# Patient Record
Sex: MALE | Race: WHITE | HISPANIC OR LATINO | Employment: OTHER | URBAN - METROPOLITAN AREA
[De-identification: names, ages, dates, MRNs, and addresses within clinical notes are randomized per-mention and may not be internally consistent; named-entity substitution may affect disease eponyms.]

---

## 2017-11-23 ENCOUNTER — APPOINTMENT (EMERGENCY)
Dept: RADIOLOGY | Facility: HOSPITAL | Age: 62
End: 2017-11-23

## 2017-11-23 ENCOUNTER — HOSPITAL ENCOUNTER (EMERGENCY)
Facility: HOSPITAL | Age: 62
Discharge: HOME | End: 2017-11-23
Attending: EMERGENCY MEDICINE | Admitting: EMERGENCY MEDICINE

## 2017-11-23 VITALS
DIASTOLIC BLOOD PRESSURE: 84 MMHG | HEART RATE: 86 BPM | WEIGHT: 140 LBS | TEMPERATURE: 97.7 F | OXYGEN SATURATION: 98 % | SYSTOLIC BLOOD PRESSURE: 135 MMHG | RESPIRATION RATE: 16 BRPM

## 2017-11-23 DIAGNOSIS — M54.9 BACK PAIN: Primary | ICD-10-CM

## 2017-11-23 PROCEDURE — 72070 X-RAY EXAM THORAC SPINE 2VWS: CPT

## 2017-11-23 PROCEDURE — 96372 THER/PROPH/DIAG INJ SC/IM: CPT

## 2017-11-23 PROCEDURE — 99283 EMERGENCY DEPT VISIT LOW MDM: CPT

## 2017-11-23 PROCEDURE — 72100 X-RAY EXAM L-S SPINE 2/3 VWS: CPT

## 2017-11-23 RX ORDER — KETOROLAC TROMETHAMINE 30 MG/ML
15 INJECTION, SOLUTION INTRAMUSCULAR; INTRAVENOUS ONCE
Status: COMPLETED | OUTPATIENT
Start: 2017-11-23 | End: 2017-11-23

## 2017-11-23 RX ORDER — ACETAMINOPHEN 325 MG/1
975 TABLET ORAL ONCE
Status: COMPLETED | OUTPATIENT
Start: 2017-11-23 | End: 2017-11-23

## 2017-11-23 RX ORDER — LIDOCAINE 50 MG/G
1 PATCH TOPICAL ONCE
Status: DISCONTINUED | OUTPATIENT
Start: 2017-11-23 | End: 2017-11-23 | Stop reason: HOSPADM

## 2017-11-23 RX ORDER — IBUPROFEN 600 MG/1
600 TABLET ORAL EVERY 6 HOURS PRN
Qty: 30 TABLET | Refills: 0 | Status: SHIPPED | OUTPATIENT
Start: 2017-11-23

## 2017-11-23 RX ADMIN — LIDOCAINE 1 PATCH: 50 PATCH TOPICAL at 18:29

## 2017-11-23 RX ADMIN — ACETAMINOPHEN 975 MG: 325 TABLET, FILM COATED ORAL at 18:26

## 2017-11-23 RX ADMIN — KETOROLAC TROMETHAMINE 15 MG: 30 INJECTION, SOLUTION INTRAMUSCULAR at 18:27

## 2017-11-24 NOTE — ED ATTENDING ATTESTATION
Jonny Rodríguez MD, saw and evaluated the patient  I have discussed the patient with the resident/non-physician practitioner and agree with the resident's/non-physician practitioner's findings, Plan of Care, and MDM as documented in the resident's/non-physician practitioner's note, except where noted  All available labs and Radiology studies were reviewed  At this point I agree with the current assessment done in the Emergency Department  I have conducted an independent evaluation of this patient a history and physical is as follows:  Back pain in entire back, burning and aching, worse with twisting  Woke him up when he rolled over during the night  No fever, no numbness/tingling/weakness, no bowel/bladder sxs, no ivda  No trauma  Ros otherwise neg and 12 systems reviewed  Plan medications, xray to r/o pathological fx   xrays reviewed, t spine with evidence of osteoarthritis   Agree with plan    Critical Care Time  CritCare Time

## 2017-11-24 NOTE — DISCHARGE INSTRUCTIONS
Sigue a tu doctor in 1-3 fox  Si los sintomas regresen o se pongan peor regresa a departmento de emergencia de inmediatol    Dolor yuridia de espalda inferior   LO QUE NECESITA SABER:   El dolor yuridia de la región lumbar de la espalda es lisa molestia repentina en la parte inferior de andino espalda que dura hasta por 6 semanas  La molestia hace que sea dificil que usted tolere la [place]  INSTRUCCIONES SOBRE EL GABRIELA HOSPITALARIA:   Regrese a la alexia de emergencias si:   · Usted tiene dolor intenso  · Usted repentinamente tiene rigidez o siente pesadez en ambos glúteos hacia abajo de ambas piernas  · Usted tiene entumecimiento o debilidad en lisa pierna o dolor en ambas piernas  · Usted tiene entumecimiento en el área genital o en la región lumbar  · Usted no puede controlar andino orina ni mg deposiciones intestinales  Pregúntele a andino [facility] vitaminas y minerales son adecuados para usted  · Usted tiene fiebre  · Usted tiene un dolor por la noche o cuando descansa  · Andino dolor no mejora con el tratamiento  · Usted tiene dolor que empeora cuando tose o estornuda  · Usted siente un estallido o chasquido repentino en andino espalda  · Usted tiene preguntas o inquietudes acerca de andino condición o cuidado  Medicamentos:  Los siguientes medicamentos pueden  ser recetados por andino médico:  · El acetaminofén  marcella el dolor  Está disponible sin receta médica  Pregunte la cantidad y la frecuencia con que debe tomarlos  [address]  El acetaminofén puede causar daño en el hígado cuando no se jesu de forma correcta  · AINEs (Analgésicos antiinflamatorios no esteroides)  ayudan a disminuir la inflamación y el dolor  Sharlene medicamento esta disponible con o sin lisa receta médica  Los AINEs pueden causar sangrado estomacal o problemas renales en ciertas personas  Si usted jesu un medicamento anticoagulante, siempre pregúntele a andino médico si los JOSE A son seguros para usted   Siempre jairo la etiqueta de iyzicoex [employer] y Crowe Lily instrucciones  · Un medicamento con receta para el dolor  podrían ser Laurey Pu  Pregunte al médico cómo debe mandie frannie medicamento de forma dhaliwal  · Relajantes musculares  disminuyen el dolor y [employer] músculos de la parte inferior de la columna  · [place] mg medicamentos remedios se le haya indicado  Consulte con nadino médico si usted janiya que andino medicamento no le está ayudando o si presenta efectos secundarios  Infórmele si es alérgico a algún medicamento  Mantenga lisa lista actualizada de los [place], las vitaminas y los productos herbales que jesu  Incluya los siguientes datos de los medicamentos: cantidad, frecuencia y motivo de administración  Traiga con usted la lista o los envases de la píldoras a mg citas de seguimiento  Lleve la lista de los medicamentos con usted en celia de lisa emergencia  Cuidados personales:   · Manténgase activo  lo más que pueda sin causar más dolor  El reposo en cama puede empeorar andino dolor de espalda  Comience con ejercicios ligeros ermedios caminar  Evite levantar objetos hasta que ya no tenga dolor  Solicite más información acerca de las actividades físicas o plan de ejercicios que son los adecuados para usted  · El hielo  ayuda a disminuir la inflamación, el dolor y los espasmos musculares  Ponga hielo bryan en lisa bolsa plástica  Cúbrala con lisa toalla  Aplíquela en andino cherrie lumbar por 20 a 30 minutos cada 2 horas  Shreya esto por 2 a 3 días después que el dolor empiece, o según lo indicado  · El calor  ayuda a disminuir dolor y espasmos musculares  Empiece a utilizar calor después de rudolph terminado el tratamiento con el hielo  Utilice lisa toalla pequeña empapada con Circle, lisa almohada térmica o tome un baño de ana con agua tibia  Aplíquese calor en el área lesionada alek 20 a 30 minutos cada 2 horas alke la cantidad de [employer] indiquen  [place] entre el calor y el hielo    Prevenir el dolor yuridia de la parte inferior de la espalda:   · Use la mecánica corporal adecuada  ¨ Flexione la cadera y las rodillas cuando Sarah Mcclain a levantar un objeto  No doble la cintura  Utilice los [employer] de las piernas mientras levanta andino carga  No use andino espalda  Mantenga el objeto cerca de andino pecho mientras lo levanta  No se tuerza, ni levante cualquier cosa por encima de andino cintura  ¨ Cambie andino posición frecuentemente cuando pase mucho tiempo de pie  Descanse un pie sobre lisa Phylliss Double o un reposapiés e intercambie con el otro pie frecuentemente  ¨ No permanezca sentado por lapsos de tiempo prolongados  Cuando sea necesario hacerlo, siéntese en lisa silla de respaldo recto con los pies apoyados en el suelo  Nunca alcance, jale ni empuje mientras se encuentra sentando  · Shreya ejercicios que fortalezcan mg músculos de la espalda  Entre en calor antes de hacer ejercicio  Consulte con andino médico sobre [employer] plan de ejercicios para usted  · Mantenga un peso saludable  Consulte con andino médico cuánto debería pesar  Pida que le ayude a crear un plan para bajar de peso si usted tiene sobrepeso  Acuda a mg consultas de control con andino médico según le indicaron  Regrese a lisa karen de seguimiento si usted aun tiene [employer] de 1 a 3 semanas de [place]  Puede que usted necesite acudir con un ortopedista si andino dolor de espalda dura más de 12 semanas  Anote mg preguntas para que se acuerde de hacerlas alek mg visitas  © 2017 [facility] Information is for End User's use only and may not be sold, redistributed or otherwise used for commercial purposes  All illustrations and images included in CareNotes® are the copyrighted property of A D A M , Inc  or Rafat Stephie  Esta información es sólo para uso en educación  Andino intención no es darle un consejo médico sobre enfermedades o tratamientos   Colsulte con andino [place] farmacéutico antes de seguir cualquier régimen [employer] para saber si es seguro y efectivo para usted

## 2017-11-24 NOTE — ED PROVIDER NOTES
History  Chief Complaint   Patient presents with    Back Pain     pt states back hurts x3 weeks upper and lower back     47-year-old male comes emergent department for evaluation of back pain  Patient states that he has had back back pain for the past 3 weeks  Patient states the back pain is diffuse, burning from his thoracic to lumbar for spine    States that the pain has woken him up at night  Patient states that he has been taken Advil with moderate relief  Patient states that back pain is worse with position on and with movement  Patient denies any trauma or inciting events  Patient denies any urinary or bowel incontinence  Denies any numbness or weakness  Otherwise, patient denies any fever, chest pain, shortness of breath, nausea, vomiting IVDA use  Medical management decision making:  Patient presenting with is likely to be musculoskeletal back pain  I will give patient Toradol Tylenol Lidoderm patch for further evaluation  Also order a thoracic and lumbar film to evaluate for masses  None       History reviewed  No pertinent past medical history  History reviewed  No pertinent surgical history  History reviewed  No pertinent family history  I have reviewed and agree with the history as documented  Social History   Substance Use Topics    Smoking status: Never Smoker    Smokeless tobacco: Never Used    Alcohol use Yes      Comment: occasional        Review of Systems   Constitutional: Negative for appetite change, chills, diaphoresis, fatigue and fever  HENT: Negative for congestion, ear discharge, ear pain, hearing loss, postnasal drip, rhinorrhea, sneezing and sore throat  Eyes: Negative for pain, discharge and redness  Respiratory: Negative for cough, choking, chest tightness, shortness of breath, wheezing and stridor  Cardiovascular: Negative for chest pain and palpitations     Gastrointestinal: Negative for abdominal distention, abdominal pain, blood in stool, constipation, diarrhea, nausea and vomiting  Genitourinary: Negative for decreased urine volume, difficulty urinating, dysuria, flank pain, frequency and hematuria  Musculoskeletal: Positive for back pain  Negative for arthralgias, gait problem, joint swelling and neck pain  Skin: Negative for color change, pallor and rash  Allergic/Immunologic: Negative for environmental allergies, food allergies and immunocompromised state  Neurological: Negative for dizziness, seizures, weakness, light-headedness, numbness and headaches  Hematological: Negative for adenopathy  Does not bruise/bleed easily  Psychiatric/Behavioral: Negative for agitation and behavioral problems  Physical Exam  ED Triage Vitals [11/23/17 1746]   Temperature Pulse Respirations Blood Pressure SpO2   97 7 °F (36 5 °C) 86 16 135/84 98 %      Temp src Heart Rate Source Patient Position - Orthostatic VS BP Location FiO2 (%)   -- -- -- -- --      Pain Score       8           Orthostatic Vital Signs  Vitals:    11/23/17 1746   BP: 135/84   Pulse: 86       Physical Exam   Constitutional: He is oriented to person, place, and time  He appears well-developed and well-nourished  HENT:   Head: Normocephalic and atraumatic  Nose: Nose normal    Mouth/Throat: Oropharynx is clear and moist    Eyes: Conjunctivae and EOM are normal  Pupils are equal, round, and reactive to light  Neck: Normal range of motion  Neck supple  Cardiovascular: Normal rate, regular rhythm and normal heart sounds  Exam reveals no gallop and no friction rub  No murmur heard  Pulmonary/Chest: Effort normal and breath sounds normal  No respiratory distress  He has no wheezes  He has no rales  Abdominal: Soft  Bowel sounds are normal  He exhibits no distension  There is no tenderness  There is no rebound and no guarding  Musculoskeletal: Normal range of motion  He exhibits tenderness  Neurological: He is alert and oriented to person, place, and time  Skin: Skin is warm and dry  Psychiatric: He has a normal mood and affect  His behavior is normal    Nursing note and vitals reviewed  ED Medications  Medications   ketorolac (TORADOL) 30 mg/mL injection 15 mg (15 mg Intramuscular Given 11/23/17 1827)   acetaminophen (TYLENOL) tablet 975 mg (975 mg Oral Given 11/23/17 1826)       Diagnostic Studies  Results Reviewed     None                 XR lumbar spine 2 or 3 views   ED Interpretation by Marycruz Alvarez MD (11/23 1926)   No fracture or masses noted      Final Result by Luz Elena Barajas MD (11/23 1956)      No acute fracture or traumatic listhesis  Workstation performed: [id number]         XR thoracic spine 2 views   ED Interpretation by Marycruz Alvarez MD (11/23 1935)   No fracture noted or masses      Final Result by Luz Elena Barajas MD (11/23 1955)      No acute fracture or traumatic listhesis  Workstation performed: [id number]               Procedures  Procedures      Phone Consults  ED Phone Contact    ED Course  ED Course                                MDM  CritCare Time    Disposition  Final diagnoses:   Back pain     Time reflects when diagnosis was documented in both MDM as applicable and the Disposition within this note     Time User Action Codes Description Comment    11/23/2017  7:38 PM Madhu Lau Add [M54 9] Back pain       ED Disposition     ED Disposition Condition Comment    Discharge  [address] discharge to home/self care      Condition at discharge: Stable        Follow-up Information     Follow up With Specialties Details Why [facility]  In 3 days  [address] [facility] [address] [address]        Discharge Medication List as of 11/23/2017  7:42 PM      START taking these medications    Details   ibuprofen (MOTRIN) 600 mg tablet Take 1 tablet by mouth every 6 (six) hours as needed for mild pain, Starting Thu 11/23/2017, Print No discharge procedures on file  ED Provider  Attending physically available and evaluated Shabana Hayes I managed the patient along with the ED Attending      Electronically Signed by         Rita Meng MD  Resident  11/24/17 1991

## 2018-12-26 ENCOUNTER — HOSPITAL ENCOUNTER (EMERGENCY)
Facility: HOSPITAL | Age: 63
Discharge: HOME | End: 2018-12-26
Attending: EMERGENCY MEDICINE | Admitting: EMERGENCY MEDICINE

## 2018-12-26 VITALS
BODY MASS INDEX: 23.32 KG/M2 | HEIGHT: 65 IN | TEMPERATURE: 98.7 F | RESPIRATION RATE: 18 BRPM | OXYGEN SATURATION: 97 % | WEIGHT: 140 LBS | SYSTOLIC BLOOD PRESSURE: 138 MMHG | DIASTOLIC BLOOD PRESSURE: 77 MMHG | HEART RATE: 90 BPM

## 2018-12-26 DIAGNOSIS — J32.9 SINUSITIS: Primary | ICD-10-CM

## 2018-12-26 PROCEDURE — 99282 EMERGENCY DEPT VISIT SF MDM: CPT

## 2018-12-26 RX ORDER — AZITHROMYCIN 250 MG/1
TABLET, FILM COATED ORAL
Qty: 6 TABLET | Refills: 0 | Status: SHIPPED | OUTPATIENT
Start: 2018-12-26 | End: 2018-12-31

## 2018-12-26 RX ORDER — BENZONATATE 200 MG/1
200 CAPSULE ORAL 3 TIMES DAILY PRN
Qty: 21 CAPSULE | Refills: 0 | Status: SHIPPED | OUTPATIENT
Start: 2018-12-26 | End: 2019-01-02

## 2018-12-26 NOTE — DISCHARGE INSTRUCTIONS
Sinusitis   LO QUE NECESITA SABER:   La sinusitis es la inflamación o infección de los senos paranasales  La mayoría de las veces es a causa de un virus  La sinusitis aguda podría durar hasta 12 semanas  La sinusitis crónica dura más de 12 semanas  La sinusitis recurrente significa que la padece 4 o más veces en 1 año  INSTRUCCIONES SOBRE EL GABRIELA HOSPITALARIA:   Regrese a la alexia de emergencias si:   · Andino luis y párpado están enrojecidos, inflamados y adoloridos  · Usted no puede abrir andino luis  · Usted tiene cambios en la visión, remedios visión doble  · Andino globo ocular sobresale o usted no puede  andino luis  · Usted tiene más sueño de lo normal o nota cambios en andino habilidad de pensar, moverse o hablar  · Usted tiene el anthony rígido, fiebre o un hilario dolor de ruperto  · Usted tiene inflamada la frente o el cuero cabelludo  Pregúntele a andino Leamon Handsome vitaminas y minerales son adecuados para usted  · Sophia síntomas no mejoran después de 3 días  · Sophia síntomas no desaparecen después de 10 días  · Usted tiene náuseas y vómitos  · Le sangra la [place]  · Usted tiene preguntas o inquietudes acerca de andino condición o cuidado  Medicamentos:  Sophia síntomas podrían desaparecer por sí solos  Andino médico puede recomendar lisa espera atenta hasta 10 días antes de iniciar el tratamiento con antibióticos  Es posible que usted necesite alguno de los siguientes:  · Acetaminofeno:  marcella el dolor y baja la fiebre  Está disponible sin receta médica  Pregunte la cantidad y la frecuencia con que debe tomarlos  [address]  Reshma las etiquetas de todos los demás medicamentos que esté usando para saber si también contienen acetaminofén, o pregunte a andino médico o farmacéutico  El acetaminofén puede causar daño en el hígado cuando no se jesu de forma correcta  No use más de 4 gramos (4000 miligramos) en total de acetaminofeno en un día       · AINEs (Analgésicos antiinflamatorios no esteroides) remedios el ibuprofeno, ayudan a disminuir la inflamación, el dolor y la [place]  Sharlene medicamento esta disponible con o sin lisa receta médica  Los AINEs pueden causar sangrado estomacal o problemas renales en ciertas personas  Si usted jesu un medicamento anticoagulante, siempre pregúntele a andino médico si los JOSE A son seguros para usted  Siempre jairo la etiqueta de sharlene medicamento y [place] instrucciones  · Los aerosoles nasales con esteroides  podrían ayudar a disminuir la inflamación de andino nariz y senos paranasales  · Los descongestionantes  ayudan a reducir la inflamación y a drenar la mucosidad en la nariz y los senos paranasales  Los descongestionantes podrían ayudarle a respirar más fácilmente  · Los antihistamínicos  ayudan a secar la mucosidad en andino [place] y a aliviar los estornudos  · Antibióticos  ayudan a tratar o prevenir infecciones bacteriales  · [place] mg medicamentos remedios se le haya indicado  Consulte con andino médico si usted janiya que andino medicamento no le está ayudando o si presenta efectos secundarios  Infórmele si es alérgico a cualquier medicamento  Mantenga lisa lista actualizada de los [place], las vitaminas y los productos herbales que jesu  Incluya los siguientes datos de los medicamentos: cantidad, frecuencia y motivo de administración  Traiga con usted la lista o los envases de la píldoras a mg citas de seguimiento  Lleve la lista de los medicamentos con usted en celia de lisa emergencia  Cuidados personales:   · Enjuague mg senos paranasales  Use un aparato para enjuagar mg fosas nasales con lisa solución salina (agua salada) o agua destilada  No use agua de la llave  [place] ayudará a diluir la mucosidad en la nariz eliminando el polen y la suciedad  También ayudará a reducir la inflamación para que usted pueda respirar normalmente  Pregunte a andino médico con qué frecuencia hacerlo  · Respire vapor  [place] agua en un sartén hasta que salga vapor   Inclínese sobre el cuenco y jorden lisa carpa con lisa toalla rony  Respire profundamente por aproximadamente 20 minutos  Tenga cuidado de no acercarse demasiado al vapor o de quemarse  Shreya esto 3 veces al día  Usted también puede respirar profundamente mientras jesu lisa ducha caliente  · Duerma con la Joseluis Harm  Coloque lisa almohada adicional debajo de gary ruperto antes de dormir para ayudar a drenar mg senos paranasales  · [address] se le haya indicado  Pregunte a gary médico sobre la cantidad de líquido que necesita mandie todos los días y cuáles le recomienda  Los líquidos van a diluir la mucosidad en gary [place] and [place] y [facility] a drenarla  Evite las bebidas que contienen alcohol o cafeína  · No fume y evite el humo de [place]  La nicotina y otros químicos en los cigarrillos y cigarros pueden empeorar mg síntomas  Pida información a gary médico si usted actualmente fuma y necesita ayuda para dejar de fumar  Los cigarrillos electrónicos o tabaco sin humo todavía contienen nicotina  Consulte con gary médico antes de [employer]  Evite el contagio de gérmenes que causan la sinusitis:  [employer] las daphne frecuentemente con agua y [place]  [employer] las daphne después de usar el baño, cambiarle el pañal a un katie o estornudar  Lávese las daphne antes de comer o preparar alimentos  Acuda a mg consultas de control con gary médico según le indicaron  Usted puede ser referido a un especialista en garganta, oído y nariz  Anote mg preguntas para que se acuerde de hacerlas alek mg visitas  © 2017 [address] Information is for End User's use only and may not be sold, redistributed or otherwise used for commercial purposes  All illustrations and images included in CareNotes® are the copyrighted property of A D A M , Inc  or Rafat Card  Esta información es sólo para uso en educación  Gary intención no es darle un consejo médico sobre enfermedades o tratamientos   Colsulte con gary Dunbar Ringer o farmacéutico antes de seguir cualquier régimen médico para saber si es seguro y efectivo para usted

## 2018-12-26 NOTE — ED PROVIDER NOTES
History  Chief Complaint   Patient presents with    Nasal Congestion     patient reports nasal congestion and sore throat x3 weeks  been taking Mucinex with no relief    Sore Throat     27-year-old male presents emergency room for evaluation of nasal congestion and productive cough  Onset 2 weeks ago  Complains of headache and sinus pressure  States that nasal drainage is clear  Admits to sneezing  Complains of sore throat worse with swallowing and coughing  Also admits to some mild left ear pain  Denies fever  He is taking Mucinex without relief  History provided by:  Patient  Sore Throat   Associated symptoms: cough and headaches    Associated symptoms: no chest pain, no chills, no fever, no rash and no shortness of breath        Prior to Admission Medications   Prescriptions Last Dose Informant Patient Reported? Taking?   ibuprofen (MOTRIN) 600 mg tablet   No No   Sig: Take 1 tablet by mouth every 6 (six) hours as needed for mild pain      Facility-Administered Medications: None       Past Medical History:   Diagnosis Date    Renal disorder     pylonephritis       History reviewed  No pertinent surgical history  History reviewed  No pertinent family history  I have reviewed and agree with the history as documented  Social History   Substance Use Topics    Smoking status: Never Smoker    Smokeless tobacco: Never Used    Alcohol use Yes      Comment: occasional        Review of Systems   Constitutional: Negative for chills and fever  HENT: Positive for congestion, sinus pain, sinus pressure and sore throat  Respiratory: Positive for cough  Negative for shortness of breath  Cardiovascular: Negative for chest pain  Skin: Negative for rash  Neurological: Positive for headaches  Physical Exam  Physical Exam   Constitutional: He appears well-developed and well-nourished     HENT:   Right Ear: Tympanic membrane and external ear normal    Left Ear: Tympanic membrane and external ear normal    Nose: Mucosal edema and rhinorrhea present  Right sinus exhibits maxillary sinus tenderness  Left sinus exhibits maxillary sinus tenderness  Mouth/Throat: Uvula is midline, oropharynx is clear and moist and mucous membranes are normal  No tonsillar exudate  Eyes: Conjunctivae are normal    Neck: Neck supple  Cardiovascular: Normal rate, regular rhythm and normal heart sounds  Pulmonary/Chest: Effort normal and breath sounds normal    Lymphadenopathy:     He has no cervical adenopathy  Neurological: He is alert  Skin: Skin is warm and dry  No rash noted  Nursing note and vitals reviewed  Vital Signs  ED Triage Vitals [12/26/18 1456]   Temperature Pulse Respirations Blood Pressure SpO2   98 7 °F (37 1 °C) 90 18 138/77 97 %      Temp Source Heart Rate Source Patient Position - Orthostatic VS BP Location FiO2 (%)   Oral Monitor Lying Right arm --      Pain Score       No Pain           Vitals:    12/26/18 1456   BP: 138/77   Pulse: 90   Patient Position - Orthostatic VS: Lying       Visual Acuity      ED Medications  Medications - No data to display    Diagnostic Studies  Results Reviewed     None                 No orders to display              Procedures  Procedures       Phone Contacts  ED Phone Contact    ED Course                               MDM  Number of Diagnoses or Management Options  Sinusitis:   Patient Progress  Patient progress: stable    CritCare Time    Disposition  Final diagnoses:   Sinusitis     Time reflects when diagnosis was documented in both MDM as applicable and the Disposition within this note     Time User Action Codes Description Comment    12/26/2018  4:27 PM Ivania Talbot Add [J32 9] Sinusitis       ED Disposition     ED Disposition Condition Comment    Discharge  [address] discharge to home/self care      Condition at discharge: Good        Follow-up Information     Follow up With Specialties Details Why [address]  In 3 days Set up primary physician [phone]            Patient's Medications   Discharge Prescriptions    AZITHROMYCIN (ZITHROMAX) 250 MG TABLET    2 PO Daily on Day 1 then 1 PO daily days 2-5       Start Date: 12/26/2018End Date: 12/31/2018       Order Dose: --       Quantity: 6 tablet    Refills: 0    BENZONATATE (TESSALON) 200 MG CAPSULE    Take 1 capsule (200 mg total) by mouth 3 (three) times a day as needed for cough for up to 7 days       Start Date: 12/26/2018End Date: 1/2/2019       Order Dose: 200 mg       Quantity: 21 capsule    Refills: 0     No discharge procedures on file      ED Provider  Electronically Signed by           Miracle Morrow PA-C  01/15/19 5355

## 2024-05-27 ENCOUNTER — HOSPITAL ENCOUNTER (EMERGENCY)
Facility: HOSPITAL | Age: 69
Discharge: HOME | End: 2024-05-27
Attending: EMERGENCY MEDICINE | Admitting: EMERGENCY MEDICINE
Payer: MEDICARE

## 2024-05-27 VITALS
WEIGHT: 145 LBS | TEMPERATURE: 98.7 F | RESPIRATION RATE: 18 BRPM | BODY MASS INDEX: 24.16 KG/M2 | HEART RATE: 81 BPM | HEIGHT: 65 IN | SYSTOLIC BLOOD PRESSURE: 137 MMHG | DIASTOLIC BLOOD PRESSURE: 95 MMHG | OXYGEN SATURATION: 99 %

## 2024-05-27 DIAGNOSIS — L29.9 ITCHING: ICD-10-CM

## 2024-05-27 DIAGNOSIS — D75.1 ERYTHROCYTOSIS: Primary | ICD-10-CM

## 2024-05-27 LAB
ALBUMIN SERPL BCP-MCNC: 4.3 G/DL (ref 3.5–5)
ALP SERPL-CCNC: 75 U/L (ref 34–104)
ALT SERPL W P-5'-P-CCNC: 13 U/L (ref 7–52)
ANION GAP SERPL CALCULATED.3IONS-SCNC: 9 MMOL/L (ref 4–13)
AST SERPL W P-5'-P-CCNC: 19 U/L (ref 13–39)
BASOPHILS # BLD AUTO: 0.1 THOUSANDS/ÂΜL (ref 0–0.1)
BASOPHILS NFR BLD AUTO: 1 % (ref 0–1)
BILIRUB SERPL-MCNC: 1.53 MG/DL (ref 0.2–1)
BUN SERPL-MCNC: 17 MG/DL (ref 5–25)
CALCIUM SERPL-MCNC: 9.8 MG/DL (ref 8.4–10.2)
CHLORIDE SERPL-SCNC: 106 MMOL/L (ref 96–108)
CO2 SERPL-SCNC: 24 MMOL/L (ref 21–32)
CREAT SERPL-MCNC: 0.95 MG/DL (ref 0.6–1.3)
EOSINOPHIL # BLD AUTO: 0.5 THOUSAND/ÂΜL (ref 0–0.61)
EOSINOPHIL NFR BLD AUTO: 5 % (ref 0–6)
ERYTHROCYTE [DISTWIDTH] IN BLOOD BY AUTOMATED COUNT: 13.4 % (ref 11.6–15.1)
GFR SERPL CREATININE-BSD FRML MDRD: 83 ML/MIN/1.73SQ M
GLUCOSE SERPL-MCNC: 98 MG/DL (ref 65–140)
HCT VFR BLD AUTO: 53 % (ref 36.5–49.3)
HGB BLD-MCNC: 18.2 G/DL (ref 12–17)
IMM GRANULOCYTES # BLD AUTO: 0.05 THOUSAND/UL (ref 0–0.2)
IMM GRANULOCYTES NFR BLD AUTO: 1 % (ref 0–2)
LYMPHOCYTES # BLD AUTO: 3.47 THOUSANDS/ÂΜL (ref 0.6–4.47)
LYMPHOCYTES NFR BLD AUTO: 32 % (ref 14–44)
MCH RBC QN AUTO: 33.5 PG (ref 26.8–34.3)
MCHC RBC AUTO-ENTMCNC: 34.3 G/DL (ref 31.4–37.4)
MCV RBC AUTO: 98 FL (ref 82–98)
MONOCYTES # BLD AUTO: 1.14 THOUSAND/ÂΜL (ref 0.17–1.22)
MONOCYTES NFR BLD AUTO: 11 % (ref 4–12)
NEUTROPHILS # BLD AUTO: 5.63 THOUSANDS/ÂΜL (ref 1.85–7.62)
NEUTS SEG NFR BLD AUTO: 50 % (ref 43–75)
NRBC BLD AUTO-RTO: 0 /100 WBCS
PLATELET # BLD AUTO: 250 THOUSANDS/UL (ref 149–390)
PMV BLD AUTO: 10.3 FL (ref 8.9–12.7)
POTASSIUM SERPL-SCNC: 4.1 MMOL/L (ref 3.5–5.3)
PROT SERPL-MCNC: 8 G/DL (ref 6.4–8.4)
RBC # BLD AUTO: 5.43 MILLION/UL (ref 3.88–5.62)
SODIUM SERPL-SCNC: 139 MMOL/L (ref 135–147)
WBC # BLD AUTO: 10.89 THOUSAND/UL (ref 4.31–10.16)

## 2024-05-27 PROCEDURE — 36415 COLL VENOUS BLD VENIPUNCTURE: CPT

## 2024-05-27 PROCEDURE — 99283 EMERGENCY DEPT VISIT LOW MDM: CPT

## 2024-05-27 PROCEDURE — 85025 COMPLETE CBC W/AUTO DIFF WBC: CPT

## 2024-05-27 PROCEDURE — 99284 EMERGENCY DEPT VISIT MOD MDM: CPT | Performed by: EMERGENCY MEDICINE

## 2024-05-27 PROCEDURE — 80053 COMPREHEN METABOLIC PANEL: CPT

## 2024-05-27 RX ORDER — DIPHENHYDRAMINE HCL 25 MG
50 TABLET ORAL ONCE
Status: COMPLETED | OUTPATIENT
Start: 2024-05-27 | End: 2024-05-27

## 2024-05-27 RX ADMIN — DIPHENHYDRAMINE HCL 50 MG: 25 TABLET ORAL at 13:05

## 2024-05-27 NOTE — ED ATTENDING ATTESTATION
5/27/2024  I, Aj Daniels MD, saw and evaluated the patient. I have discussed the patient with the resident/non-physician practitioner and agree with the resident's/non-physician practitioner's findings, Plan of Care, and MDM as documented in the resident's/non-physician practitioner's note, except where noted. All available labs and Radiology studies were reviewed.  I was present for key portions of any procedure(s) performed by the resident/non-physician practitioner and I was immediately available to provide assistance.       At this point I agree with the current assessment done in the Emergency Department.  I have conducted an independent evaluation of this patient a history and physical is as follows:  Itchy for about 1 to 2 months   pt concerned about bites noted on leg  Non smoker   No fever no abd pain no v/d no sob  Exam nad anicteric  mmm  no lesions in mouth   Lung clear heart rrr no m abd soft  no hsm  Skin has some excoriated bite like lesions on upper thighs b/l    Imp pruritus   Likely insect bites  Check labs bilirubin renal function   As pruritus is more generalized than   ED Course   Patient has elevated hemoglobin  Concerning for polycythemia  Referral to hematology as outpatient patient agrees to follow-up  Will do referral placed    Critical Care Time  Procedures

## 2024-05-27 NOTE — ED PROVIDER NOTES
History  Chief Complaint   Patient presents with    Rash     Per pt he has had a rash x2 months, pt has taken benadryl, and topical creams and nothing is helping.      68-year-old man with relevant past medical history of CABG, hypertension, and hyperlipidemia presents with itching.  Patient reports worsening itching over the last 2 months.  It is generalized.  He has not noticed any bug bites.  The itching is worse after showering.  He has never had this happen before.  Patient recently moved here from [place] and does not have outpatient follow-up established.  He has not had fevers or chills.  Denies any other symptoms aside from the rash.  He has tried Cetaphil and eczema creams with minimal improvement.        Prior to Admission Medications   Prescriptions Last Dose Informant Patient Reported? Taking?   ibuprofen (MOTRIN) 600 mg tablet   No No   Sig: Take 1 tablet by mouth every 6 (six) hours as needed for mild pain      Facility-Administered Medications: None       Past Medical History:   Diagnosis Date    Renal disorder     pylonephritis       History reviewed. No pertinent surgical history.    History reviewed. No pertinent family history.  I have reviewed and agree with the history as documented.    E-Cigarette/Vaping     E-Cigarette/Vaping Substances     Social History     Tobacco Use    Smoking status: Never    Smokeless tobacco: Never   Substance Use Topics    Alcohol use: Yes     Comment: occasional    Drug use: No        Review of Systems    Physical Exam  ED Triage Vitals [05/27/24 1223]   Temperature Pulse Respirations Blood Pressure SpO2   98.7 °F (37.1 °C) 81 18 137/95 99 %      Temp Source Heart Rate Source Patient Position - Orthostatic VS BP Location FiO2 (%)   Temporal Monitor Sitting Left arm --      Pain Score       No Pain             Orthostatic Vital Signs  Vitals:    05/27/24 1223   BP: 137/95   Pulse: 81   Patient Position - Orthostatic VS: Sitting       Physical Exam  Vitals and  nursing note reviewed.   Constitutional:       General: He is not in acute distress.     Appearance: Normal appearance.   HENT:      Head: Normocephalic and atraumatic.      Right Ear: External ear normal.      Left Ear: External ear normal.   Cardiovascular:      Rate and Rhythm: Normal rate.   Pulmonary:      Effort: Pulmonary effort is normal. No respiratory distress.   Musculoskeletal:         General: Normal range of motion.      Cervical back: Normal range of motion and neck supple.   Skin:     General: Skin is warm and dry.      Comments: No rash appreciated.  Areas of excoriation with open wounds of the thigh.   Neurological:      Mental Status: He is alert and oriented to person, place, and time. Mental status is at baseline.   Psychiatric:         Mood and Affect: Mood normal.         Behavior: Behavior normal.                   ED Medications  Medications   diphenhydrAMINE (BENADRYL) tablet 50 mg (50 mg Oral Given 5/27/24 1305)       Diagnostic Studies  Results Reviewed       Procedure Component Value Units Date/Time    JAK2 V617F rfx CALR/MPL/E12-15 [[id number]]     Lab Status: No result Specimen: Blood     Comprehensive metabolic panel [[id number]]  (Abnormal) Collected: 05/27/24 1301    Lab Status: Final result Specimen: Blood from Arm, Left Updated: 05/27/24 1335     Sodium 139 mmol/L      Potassium 4.1 mmol/L      Chloride 106 mmol/L      CO2 24 mmol/L      ANION GAP 9 mmol/L      BUN 17 mg/dL      Creatinine 0.95 mg/dL      Glucose 98 mg/dL      Calcium 9.8 mg/dL      AST 19 U/L      ALT 13 U/L      Alkaline Phosphatase 75 U/L      Total Protein 8.0 g/dL      Albumin 4.3 g/dL      Total Bilirubin 1.53 mg/dL      eGFR 83 ml/min/1.73sq m     Narrative:      National Kidney Disease Foundation guidelines for Chronic Kidney Disease (CKD):     Stage 1 with normal or high GFR (GFR > 90 mL/min/1.73 square meters)    Stage 2 Mild CKD (GFR = 60-89 mL/min/1.73 square meters)    Stage 3A Moderate CKD (GFR = 45-59  mL/min/1.73 square meters)    Stage 3B Moderate CKD (GFR = 30-44 mL/min/1.73 square meters)    Stage 4 Severe CKD (GFR = 15-29 mL/min/1.73 square meters)    Stage 5 End Stage CKD (GFR <15 mL/min/1.73 square meters)  Note: GFR calculation is accurate only with a steady state creatinine    CBC and differential [[id number]]  (Abnormal) Collected: 05/27/24 1301    Lab Status: Final result Specimen: Blood from Arm, Left Updated: 05/27/24 1310     WBC 10.89 Thousand/uL      RBC 5.43 Million/uL      Hemoglobin 18.2 g/dL      Hematocrit 53.0 %      MCV 98 fL      MCH 33.5 pg      MCHC 34.3 g/dL      RDW 13.4 %      MPV 10.3 fL      Platelets 250 Thousands/uL      nRBC 0 /100 WBCs      Segmented % 50 %      Immature Grans % 1 %      Lymphocytes % 32 %      Monocytes % 11 %      Eosinophils Relative 5 %      Basophils Relative 1 %      Absolute Neutrophils 5.63 Thousands/µL      Absolute Immature Grans 0.05 Thousand/uL      Absolute Lymphocytes 3.47 Thousands/µL      Absolute Monocytes 1.14 Thousand/µL      Eosinophils Absolute 0.50 Thousand/µL      Basophils Absolute 0.10 Thousands/µL                    No orders to display         Procedures  Procedures      ED Course  ED Course as of 05/27/24 1445   Mon May 27, 2024   1341 Hemoglobin(!): 18.2  Concerns for polycythemia vera                             SBIRT 22yo+      Flowsheet Row Most Recent Value   Initial Alcohol Screen: US AUDIT-C     1. How often do you have a drink containing alcohol? 0 Filed at: 05/27/2024 1226   Audit-C Score 0 Filed at: 05/27/2024 1226   HANNAH: How many times in the past year have you...    Used an illegal drug or used a prescription medication for non-medical reasons? Never Filed at: 05/27/2024 1226                  Medical Decision Making  Presents with pruritus for the last 2 months.  No obvious rash appreciated but areas of excoriation were noted.  Labs obtained to rule out medical causes of pruritus.  Patient noted to have hemoglobin of 18.2.   "I am concerned for polycythemia vera as the cause of his symptoms.  I did message on-call hematology, and they recommended adding on a Jak2/calr/mpl lab and follow-up outpatient for further workup. This lab was ordered while here, but the patient left before the blood was able to be drawn. He was provided primary care and hematology referrals, so this can be worked up outside the hospital.  He can try diphenhydramine or loratadine for his itching. Patient in agreement with the medical plan and all questions were answered.  The patient verbalized understanding of my return precautions.    Previous ED, hospitalization, and outpatient documentation was reviewed.  History was obtained from the patient.    Portions or all of this note were generated using voice recognition software.  Occasional wrong word or \"sound a like\" substitutions may have occurred due to the inherent limitations of voice recognition software.  Please interpret any errors within the intended context of the whole sentence or idea.      Amount and/or Complexity of Data Reviewed  Labs: ordered. Decision-making details documented in ED Course.    Risk  OTC drugs.          Disposition  Final diagnoses:   Erythrocytosis   Itching     Time reflects when diagnosis was documented in both MDM as applicable and the Disposition within this note       Time User Action Codes Description Comment    5/27/2024  1:17 PM Santiago Schwarz Add [D75.1] Erythrocytosis     5/27/2024  1:17 PM Santiago Schwarz Add [L29.9] Itching     5/27/2024  1:47 PM Santiago Schwarz Modify [D75.1] Erythrocytosis     5/27/2024  1:47 PM Santiago Schwarz Modify [L29.9] Itching           ED Disposition       ED Disposition   Discharge    Condition   Stable    Date/Time   Mon May 27, 2024 1405    Comment   Devon Briscoe discharge to home/self care.                   Follow-up Information       Follow up With Specialties Details Why Contact Info Additional Information    [facility] " [facility] Schedule an appointment as soon as possible for a visit in 1 week  [address] [facility] [address]  [facility] [id number] [phone] [facility] [place], [address] [facility] [address] [facility] [address], [place], [place], [id number] [phone]    [facility] Internal Medicine Schedule an appointment as soon as possible for a visit in 1 week  [address] [facility] [address]  [facility] [address] [phone] [facility], [address] [facility] [address], [place], [place], [address] [phone]            Patient's Medications   Discharge Prescriptions    No medications on file         PDMP Review       None             ED Provider  Attending physically available and evaluated Devon Briscoe. I managed the patient along with the ED Attending.    Electronically Signed by           Santiago Schwarz MD  05/27/24 3934

## 2024-05-27 NOTE — DISCHARGE INSTRUCTIONS
You were seen for itching. Your hemoglobin level is elevated. You should follow up with a hematologist regarding this. It could be the cause of your itching. You can take 25 or 50 mg benadryl every 6 hours for itching. You can also try 10 mg loratidine daily for itching.    You were also referred to a primary care doctor to establish care regarding your other chronic medical conditions.

## 2024-05-31 ENCOUNTER — OFFICE VISIT (OUTPATIENT)
Dept: INTERNAL MEDICINE CLINIC | Facility: CLINIC | Age: 69
End: 2024-05-31

## 2024-05-31 VITALS
RESPIRATION RATE: 18 BRPM | WEIGHT: 145.38 LBS | HEART RATE: 76 BPM | BODY MASS INDEX: 24.22 KG/M2 | TEMPERATURE: 98.9 F | OXYGEN SATURATION: 96 % | SYSTOLIC BLOOD PRESSURE: 118 MMHG | HEIGHT: 65 IN | DIASTOLIC BLOOD PRESSURE: 77 MMHG

## 2024-05-31 DIAGNOSIS — D75.1 ERYTHROCYTOSIS: ICD-10-CM

## 2024-05-31 DIAGNOSIS — L30.9 DERMATITIS: Primary | ICD-10-CM

## 2024-05-31 DIAGNOSIS — I10 PRIMARY HYPERTENSION: ICD-10-CM

## 2024-05-31 DIAGNOSIS — E78.49 OTHER HYPERLIPIDEMIA: ICD-10-CM

## 2024-05-31 DIAGNOSIS — I25.810 CORONARY ARTERY DISEASE INVOLVING CORONARY BYPASS GRAFT OF NATIVE HEART WITHOUT ANGINA PECTORIS: ICD-10-CM

## 2024-05-31 PROCEDURE — 99204 OFFICE O/P NEW MOD 45 MIN: CPT | Performed by: FAMILY MEDICINE

## 2024-05-31 RX ORDER — CARVEDILOL 3.12 MG/1
TABLET ORAL
Qty: 90 TABLET | Refills: 3 | Status: SHIPPED | OUTPATIENT
Start: 2024-05-31

## 2024-05-31 RX ORDER — ATORVASTATIN CALCIUM 40 MG/1
40 TABLET, FILM COATED ORAL DAILY
Qty: 90 TABLET | Refills: 3 | Status: SHIPPED | OUTPATIENT
Start: 2024-05-31

## 2024-05-31 RX ORDER — CLOPIDOGREL BISULFATE 75 MG/1
75 TABLET ORAL DAILY
Qty: 90 TABLET | Refills: 3 | Status: SHIPPED | OUTPATIENT
Start: 2024-05-31

## 2024-05-31 RX ORDER — LISINOPRIL 2.5 MG/1
2.5 TABLET ORAL DAILY
Qty: 90 TABLET | Refills: 3 | Status: SHIPPED | OUTPATIENT
Start: 2024-05-31

## 2024-05-31 NOTE — PROGRESS NOTES
Ambulatory Visit  Name: Devon Briscoe      : 1955      MRN: [id number]  Encounter Provider: Xochitl Casiano MD  Encounter Date: 2024   Encounter department: [facility]    Assessment & Plan   1. Dermatitis  Assessment & Plan:  Pt has been having sx x 3 months , see detailed review , rash not very apparent on trunk , thighs not examined today continue local care Dove soap only , switch to free and clear detergent and unscented dryer sheets , continue cerave lotion await derm eval   2. Erythrocytosis  Assessment & Plan:  New finding in ED visit for evaluation of rash , looking back he has not had this , prior hgb of  16.7 , he has appt scheduled with hematology in July , will repeat CBC next week   Orders:  -     Ambulatory Referral to Internal Medicine  -     CBC and differential; Future  3. Coronary artery disease involving coronary bypass graft of native heart without angina pectoris  Assessment & Plan:  Pt had CABG in  , he has felt great ever since , has excellent stamina , no cp or SOB maintains his weight and eats healthy diet , continue same meds plavix , coreg , statin   Orders:  -     carvedilol (Coreg) 3.125 mg tablet; 1 po daily  -     clopidogrel (PLAVIX) 75 mg tablet; Take 1 tablet (75 mg total) by mouth daily  4. Primary hypertension  Assessment & Plan:  Please be watchful of diet , avoiding prepared foods and salt , exercise regularly 30- 60 minutes 4 times per week , take medications as prescribed , BP well controlled labs reviewed f/u appt 6 months    Orders:  -     lisinopril (ZESTRIL) 2.5 mg tablet; Take 1 tablet (2.5 mg total) by mouth daily  -     CBC and differential; Future  5. Other hyperlipidemia  -     atorvastatin (LIPITOR) 40 mg tablet; Take 1 tablet (40 mg total) by mouth daily  -     Lipid panel; Future         History of Present Illness     HPI New pt here to establish care  , recent ED visit  ahsly x 2 months  recently moved here from [place] , in ED hgb 18.2 , he was referred to hematology  Hx CABG 2022 , HTN , Hyperlipidemia   He walks daily , he has great stamina - cp - SOB with activity   He has a rash x 3 months mostly trunk , proximal extremities + itchy [place]   He is living in the same place , he has a dog and cat in the house , no one else I house has this   Soap for bathing was using [place] switched to Dove x a few weeks , medicated soap for rash ? Name , detergent . 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   HENT:  Negative for ear pain and sore throat.    Eyes:  Negative for pain and visual disturbance.   Respiratory:  Negative for cough and shortness of breath.    Cardiovascular:  Negative for chest pain and palpitations.   Gastrointestinal:  Negative for abdominal pain and vomiting.   Genitourinary:  Negative for dysuria and hematuria.   Musculoskeletal:  Negative for arthralgias and back pain.   Skin:  Positive for rash. Negative for color change.   Neurological:  Negative for seizures and syncope.   All other systems reviewed and are negative.      Past Medical History:   Diagnosis Date    Renal disorder     pylonephritis     Past Surgical History:   Procedure Laterality Date    CARDIAC SURGERY      pt stated two years ago 2022     Family History   Problem Relation Age of Onset    Alzheimer's disease Mother     Hypertension Father     Lung disease Sister     Hypertension Brother     Obesity Son      Social History     Tobacco Use    Smoking  "status: Never    Smokeless tobacco: Never   Vaping Use    Vaping status: Not on file   Substance and Sexual Activity    Alcohol use: Yes     Comment: occasional    Drug use: No    Sexual activity: Not on file     Current Outpatient Medications on File Prior to Visit   Medication Sig    ibuprofen (MOTRIN) 600 mg tablet Take 1 tablet by mouth every 6 (six) hours as needed for mild pain     No Known Allergies  Immunization History   Administered Date(s) Administered    COVID-19 Pfizer vac (Jose Luis-sucrose, gray cap) 12 yr+ IM 02/18/2022     Objective     /77 (BP Location: Left arm, Patient Position: Sitting, Cuff Size: Standard)   Pulse 76   Temp 98.9 °F (37.2 °C) (Temporal)   Resp 18   Ht 5' 5\" (1.651 m)   Wt 65.9 kg (145 lb 6 oz)   SpO2 96%   BMI 24.19 kg/m²     Physical Exam  Vitals and nursing note reviewed.   Constitutional:       General: He is not in acute distress.     Appearance: He is well-developed.      Comments: Skin with good color turgor , well hydrated ,no distress noted     HENT:      Head: Normocephalic and atraumatic.      Right Ear: No decreased hearing noted. No middle ear effusion. There is no impacted cerumen.      Left Ear: No decreased hearing noted.  No middle ear effusion. There is no impacted cerumen.   Eyes:      Conjunctiva/sclera: Conjunctivae normal.   Neck:      Thyroid: No thyromegaly.   Cardiovascular:      Rate and Rhythm: Normal rate and regular rhythm.      Heart sounds: Normal heart sounds. No murmur heard.  Pulmonary:      Effort: Pulmonary effort is normal. No respiratory distress.      Breath sounds: Normal breath sounds.   Abdominal:      Palpations: Abdomen is soft.      Tenderness: There is no abdominal tenderness.   Musculoskeletal:         General: No swelling.      Cervical back: Neck supple.   Lymphadenopathy:      Cervical:      Right cervical: No superficial or deep cervical adenopathy.     Left cervical: No superficial or deep cervical adenopathy.   Skin:    "  General: Skin is warm and dry.      Capillary Refill: Capillary refill takes less than 2 seconds.      Comments: Faint light brown round spots mid upper back , no redness no warmth -vesicles - pustules - excoriation face , neck upper limbs lower legs spared , he said he has rash on thighs , that area not examined today    Neurological:      Mental Status: He is alert.      Comments: Non focal exam   Psychiatric:         Attention and Perception: Attention normal.         Mood and Affect: Mood normal.         Speech: Speech normal.         Behavior: Behavior normal.       Administrative Statements

## 2024-05-31 NOTE — ASSESSMENT & PLAN NOTE
New finding in ED visit for evaluation of rash , looking back he has not had this , prior hgb of 2020 16.7 , he has appt scheduled with hematology in July , will repeat CBC next week   
Please be watchful of diet , avoiding prepared foods and salt , exercise regularly 30- 60 minutes 4 times per week , take medications as prescribed , BP well controlled labs reviewed f/u appt 6 months    
Pt had CABG in 2022 , he has felt great ever since , has excellent stamina , no cp or SOB maintains his weight and eats healthy diet , continue same meds plavix , coreg , statin   
Pt has been having sx x 3 months , see detailed review , rash not very apparent on trunk , thighs not examined today continue local care Dove soap only , switch to free and clear detergent and unscented dryer sheets , continue cerave lotion await derm eval   
ineffective breastfeeding

## 2024-06-03 ENCOUNTER — APPOINTMENT (OUTPATIENT)
Dept: LAB | Facility: CLINIC | Age: 69
End: 2024-06-03
Payer: MEDICARE

## 2024-06-03 DIAGNOSIS — E78.49 OTHER HYPERLIPIDEMIA: ICD-10-CM

## 2024-06-03 DIAGNOSIS — I10 PRIMARY HYPERTENSION: ICD-10-CM

## 2024-06-03 DIAGNOSIS — D75.1 ERYTHROCYTOSIS: ICD-10-CM

## 2024-06-03 LAB
BASOPHILS # BLD AUTO: 0.1 THOUSANDS/ÂΜL (ref 0–0.1)
BASOPHILS NFR BLD AUTO: 1 % (ref 0–1)
CHOLEST SERPL-MCNC: 182 MG/DL
EOSINOPHIL # BLD AUTO: 0.44 THOUSAND/ÂΜL (ref 0–0.61)
EOSINOPHIL NFR BLD AUTO: 5 % (ref 0–6)
ERYTHROCYTE [DISTWIDTH] IN BLOOD BY AUTOMATED COUNT: 13.2 % (ref 11.6–15.1)
HCT VFR BLD AUTO: 52.2 % (ref 36.5–49.3)
HDLC SERPL-MCNC: 39 MG/DL
HGB BLD-MCNC: 18 G/DL (ref 12–17)
IMM GRANULOCYTES # BLD AUTO: 0.06 THOUSAND/UL (ref 0–0.2)
IMM GRANULOCYTES NFR BLD AUTO: 1 % (ref 0–2)
LDLC SERPL CALC-MCNC: 113 MG/DL (ref 0–100)
LYMPHOCYTES # BLD AUTO: 3.08 THOUSANDS/ÂΜL (ref 0.6–4.47)
LYMPHOCYTES NFR BLD AUTO: 32 % (ref 14–44)
MCH RBC QN AUTO: 33.6 PG (ref 26.8–34.3)
MCHC RBC AUTO-ENTMCNC: 34.5 G/DL (ref 31.4–37.4)
MCV RBC AUTO: 97 FL (ref 82–98)
MONOCYTES # BLD AUTO: 0.99 THOUSAND/ÂΜL (ref 0.17–1.22)
MONOCYTES NFR BLD AUTO: 10 % (ref 4–12)
NEUTROPHILS # BLD AUTO: 4.84 THOUSANDS/ÂΜL (ref 1.85–7.62)
NEUTS SEG NFR BLD AUTO: 51 % (ref 43–75)
NONHDLC SERPL-MCNC: 143 MG/DL
NRBC BLD AUTO-RTO: 0 /100 WBCS
PLATELET # BLD AUTO: 256 THOUSANDS/UL (ref 149–390)
PMV BLD AUTO: 10.7 FL (ref 8.9–12.7)
RBC # BLD AUTO: 5.36 MILLION/UL (ref 3.88–5.62)
TRIGL SERPL-MCNC: 152 MG/DL
WBC # BLD AUTO: 9.51 THOUSAND/UL (ref 4.31–10.16)

## 2024-06-03 PROCEDURE — 80061 LIPID PANEL: CPT

## 2024-06-03 PROCEDURE — 85025 COMPLETE CBC W/AUTO DIFF WBC: CPT

## 2024-06-03 PROCEDURE — 36415 COLL VENOUS BLD VENIPUNCTURE: CPT

## 2024-06-07 ENCOUNTER — RA CDI HCC (OUTPATIENT)
Dept: OTHER | Facility: HOSPITAL | Age: 69
End: 2024-06-07

## 2024-06-14 ENCOUNTER — TELEPHONE (OUTPATIENT)
Dept: INTERNAL MEDICINE CLINIC | Facility: CLINIC | Age: 69
End: 2024-06-14

## 2024-06-17 ENCOUNTER — OFFICE VISIT (OUTPATIENT)
Dept: INTERNAL MEDICINE CLINIC | Facility: CLINIC | Age: 69
End: 2024-06-17

## 2024-06-17 VITALS
BODY MASS INDEX: 24 KG/M2 | OXYGEN SATURATION: 97 % | HEART RATE: 74 BPM | TEMPERATURE: 98 F | SYSTOLIC BLOOD PRESSURE: 104 MMHG | DIASTOLIC BLOOD PRESSURE: 72 MMHG | WEIGHT: 144.2 LBS

## 2024-06-17 DIAGNOSIS — Z00.00 MEDICARE ANNUAL WELLNESS VISIT, SUBSEQUENT: Primary | ICD-10-CM

## 2024-06-17 DIAGNOSIS — Z12.11 COLON CANCER SCREENING: ICD-10-CM

## 2024-06-17 DIAGNOSIS — H25.013 CORTICAL AGE-RELATED CATARACT OF BOTH EYES: ICD-10-CM

## 2024-06-17 DIAGNOSIS — I10 PRIMARY HYPERTENSION: ICD-10-CM

## 2024-06-17 DIAGNOSIS — L30.9 DERMATITIS: ICD-10-CM

## 2024-06-17 DIAGNOSIS — D75.1 ERYTHROCYTOSIS: ICD-10-CM

## 2024-06-17 DIAGNOSIS — E78.49 OTHER HYPERLIPIDEMIA: ICD-10-CM

## 2024-06-17 DIAGNOSIS — I25.810 CORONARY ARTERY DISEASE INVOLVING CORONARY BYPASS GRAFT OF NATIVE HEART WITHOUT ANGINA PECTORIS: ICD-10-CM

## 2024-06-17 PROCEDURE — G0439 PPPS, SUBSEQ VISIT: HCPCS | Performed by: FAMILY MEDICINE

## 2024-06-17 RX ORDER — ATORVASTATIN CALCIUM 80 MG/1
80 TABLET, FILM COATED ORAL DAILY
Qty: 90 TABLET | Refills: 3 | Status: SHIPPED | OUTPATIENT
Start: 2024-06-17

## 2024-06-17 NOTE — PROGRESS NOTES
Ambulatory Visit  Name: Devon Briscoe      : 1955      MRN: [id number]  Encounter Provider: Xochitl Casiano MD  Encounter Date: 2024   Encounter department: [facility]    Assessment & Plan   1. Medicare annual wellness visit, subsequent  Assessment & Plan:  Pt agreeable to schedule vision and dental exams , due for colon cancer screening agreeable to [facility]   2. Colon cancer screening  Assessment & Plan:  Pt agreeable to schedule vision and dental exams , due for colon cancer screening agreeable to [facility]   Orders:  -     At Home Occult Blood, Fecal,IA; Future  3. Coronary artery disease involving coronary bypass graft of native heart without angina pectoris  4. Primary hypertension  5. Erythrocytosis  6. Dermatitis  -     Ambulatory Referral to Dermatology; Future  7. Other hyperlipidemia  -     atorvastatin (LIPITOR) 80 mg tablet; Take 1 tablet (80 mg total) by mouth daily  -     Lipid panel; Future; Expected date: 2024  8. Cortical age-related cataract of both eyes       Preventive health issues were discussed with patient, and age appropriate screening tests were ordered as noted in patient's After Visit Summary. Personalized health advice and appropriate referrals for health education or preventive services given if needed, as noted in patient's After Visit Summary.    History of Present Illness     Rash  Pertinent negatives include no cough, fever, shortness of breath, sore throat or vomiting.    Vision  , had R cataract surgery , needs to have L one done    Dental a few years   Hearing normal   Diet he tries to follow portion control chicken , meat , fruits and veggies water 4 bottles q day   Exercise walks 2 miles daily fam hx P uncle stomach cancer   Patient Care Team:  Xochitl Casiano MD as PCP - General (Family Medicine)    Review of Systems   Constitutional:  Negative for chills and fever.   HENT:  Negative for ear pain and sore throat.     Eyes:  Negative for pain and visual disturbance.   Respiratory:  Negative for cough and shortness of breath.    Cardiovascular:  Negative for chest pain and palpitations.   Gastrointestinal:  Negative for abdominal pain and vomiting.   Genitourinary:  Negative for dysuria and hematuria.   Musculoskeletal:  Negative for arthralgias and back pain.   Skin:  Positive for rash. Negative for color change.   Neurological:  Negative for seizures and syncope.   All other systems reviewed and are negative.    Medical History Reviewed by provider this encounter:  Tobacco  Allergies  Meds  Problems  Med Hx  Surg Hx  Fam Hx       Annual Wellness Visit Questionnaire   Devon is here for his Subsequent Wellness visit.     Health Risk Assessment:   Patient rates overall health as fair. Patient feels that their physical health rating is much better. Patient is satisfied with their life. Eyesight was rated as much worse. Hearing was rated as same. Patient feels that their emotional and mental health rating is slightly better. Patients states they are sometimes angry. Patient states they are never, rarely unusually tired/fatigued. Pain experienced in the last 7 days has been none. Patient states that he has experienced no weight loss or gain in last 6 months.     Depression Screening:   PHQ-2 Score: 0      Fall Risk Screening:   In the past year, patient has experienced: no history of falling in past year      Home Safety:  Patient does not have trouble with stairs inside or outside of their home. Patient has working smoke alarms and has working carbon monoxide detector. Home safety hazards include: none.     Nutrition:   Current diet is Regular.     Medications:   Patient is currently taking over-the-counter supplements. OTC medications include: see medication list. Patient is able to manage medications.     Activities of Daily Living (ADLs)/Instrumental Activities of Daily Living (IADLs):   Walk and transfer into and out of  bed and chair?: Yes  Dress and groom yourself?: Yes    Bathe or shower yourself?: Yes    Feed yourself? Yes  Do your laundry/housekeeping?: Yes  Manage your money, pay your bills and track your expenses?: Yes  Make your own meals?: Yes    Do your own shopping?: Yes    PREVENTIVE SCREENINGS      Cardiovascular Screening:    General: Screening Not Indicated and History Lipid Disorder      Diabetes Screening:     General: Screening Current      Abdominal Aortic Aneurysm (AAA) Screening:    Risk factors include: age between 65-76 yo        Lung Cancer Screening:     General: Screening Not Indicated      Hepatitis C Screening:    General: Screening Current    Screening, Brief Intervention, and Referral to Treatment (SBIRT)    Screening  Typical number of drinks in a day: 0  Typical number of drinks in a week: 2  Interpretation: Low risk drinking behavior.    Single Item Drug Screening:  How often have you used an illegal drug (including marijuana) or a prescription medication for non-medical reasons in the past year? never    Single Item Drug Screen Score: 0  Interpretation: Negative screen for possible drug use disorder    Social Determinants of Health     Financial Resource Strain: Low Risk  (6/17/2024)    Overall Financial Resource Strain (CARDIA)     Difficulty of Paying Living Expenses: Not hard at all   Recent Concern: Financial Resource Strain - Medium Risk (5/31/2024)    Overall Financial Resource Strain (CARDIA)     Difficulty of Paying Living Expenses: Somewhat hard   Food Insecurity: No Food Insecurity (6/17/2024)    Hunger Vital Sign     Worried About Running Out of Food in the Last Year: Never true     Ran Out of Food in the Last Year: Never true   Recent Concern: Food Insecurity - Food Insecurity Present (5/31/2024)    Hunger Vital Sign     Worried About Running Out of Food in the Last Year: Sometimes true     Ran Out of Food in the Last Year: Sometimes true   Transportation Needs: No Transportation Needs  (6/17/2024)    PRAPARE - Transportation     Lack of Transportation (Medical): No     Lack of Transportation (Non-Medical): No   Housing Stability: Low Risk  (6/17/2024)    Housing Stability Vital Sign     Unable to Pay for Housing in the Last Year: No     Number of Times Moved in the Last Year: 1     Homeless in the Last Year: No   Utilities: Not At Risk (6/17/2024)    [facility] Utilities     Threatened with loss of utilities: No     No results found.    Objective     /72 (BP Location: Left arm, Patient Position: Sitting, Cuff Size: Standard)   Pulse 74   Temp 98 °F (36.7 °C) (Temporal)   Wt 65.4 kg (144 lb 3.2 oz)   SpO2 97%   BMI 24.00 kg/m²     Physical Exam  Vitals and nursing note reviewed.   Constitutional:       General: He is not in acute distress.     Appearance: He is well-developed.      Comments: Skin with good color turgor , well hydrated ,no distress noted     HENT:      Head: Normocephalic and atraumatic.   Eyes:      Conjunctiva/sclera: Conjunctivae normal.   Cardiovascular:      Rate and Rhythm: Normal rate and regular rhythm.      Heart sounds: No murmur heard.  Pulmonary:      Effort: Pulmonary effort is normal. No respiratory distress.      Breath sounds: Normal breath sounds.   Abdominal:      Palpations: Abdomen is soft.      Tenderness: There is no abdominal tenderness.   Musculoskeletal:         General: No swelling.      Cervical back: Neck supple.   Skin:     General: Skin is warm and dry.      Capillary Refill: Capillary refill takes less than 2 seconds.   Neurological:      Mental Status: He is alert.   Psychiatric:         Mood and Affect: Mood normal.       Administrative Statements

## 2024-06-17 NOTE — PATIENT INSTRUCTIONS
Medicare Preventive Visit Patient Instructions  Thank you for completing your Welcome to Medicare Visit or Medicare Annual Wellness Visit today. Your next wellness visit will be due in one year (6/18/2025).  The screening/preventive services that you may require over the next 5-10 years are detailed below. Some tests may not apply to you based off risk factors and/or age. Screening tests ordered at today's visit but not completed yet may show as past due. Also, please note that scanned in results may not display below.  Preventive Screenings:  Service Recommendations Previous Testing/Comments   Colorectal Cancer Screening  Colonoscopy    Fecal Occult Blood Test (FOBT)/Fecal Immunochemical Test (FIT)  Fecal DNA/Cologuard Test  Flexible Sigmoidoscopy Age: 45-75 years old   Colonoscopy: every 10 years (May be performed more frequently if at higher risk)  OR  FOBT/FIT: every 1 year  OR  Cologuard: every 3 years  OR  Sigmoidoscopy: every 5 years  Screening may be recommended earlier than age 45 if at higher risk for colorectal cancer. Also, an individualized decision between you and your healthcare provider will decide whether screening between the ages of 76-85 would be appropriate. Colonoscopy: Not on file  FOBT/FIT: Not on file  Cologuard: Not on file  Sigmoidoscopy: Not on file          Prostate Cancer Screening Individualized decision between patient and health care provider in men between ages of 55-69   Medicare will cover every 12 months beginning on the day after your 50th birthday PSA: No results in last 5 years           Hepatitis C Screening Once for adults born between 1945 and 1965  More frequently in patients at high risk for Hepatitis C Hep C Antibody: 01/15/2015    Screening Current   Diabetes Screening 1-2 times per year if you're at risk for diabetes or have pre-diabetes Fasting glucose: No results in last 5 years (No results in last 5 years)  A1C: No results in last 5 years (No results in last 5  years)  Screening Current   Cholesterol Screening Once every 5 years if you don't have a lipid disorder. May order more often based on risk factors. Lipid panel: 06/03/2024  Screening Not Indicated  History Lipid Disorder      Other Preventive Screenings Covered by Medicare:  Abdominal Aortic Aneurysm (AAA) Screening: covered once if your at risk. You're considered to be at risk if you have a family history of AAA or a male between the age of 65-75 who smoking at least 100 cigarettes in your lifetime.  Lung Cancer Screening: covers low dose CT scan once per year if you meet all of the following conditions: (1) Age 55-77; (2) No signs or symptoms of lung cancer; (3) Current smoker or have quit smoking within the last 15 years; (4) You have a tobacco smoking history of at least 20 pack years (packs per day x number of years you smoked); (5) You get a written order from a healthcare provider.  Glaucoma Screening: covered annually if you're considered high risk: (1) You have diabetes OR (2) Family history of glaucoma OR (3)  aged 50 and older OR (4)  American aged 65 and older  Osteoporosis Screening: covered every 2 years if you meet one of the following conditions: (1) Have a vertebral abnormality; (2) On glucocorticoid therapy for more than 3 months; (3) Have primary hyperparathyroidism; (4) On osteoporosis medications and need to assess response to drug therapy.  HIV Screening: covered annually if you're between the age of 15-65. Also covered annually if you are younger than 15 and older than 65 with risk factors for HIV infection. For pregnant patients, it is covered up to 3 times per pregnancy.    Immunizations:  Immunization Recommendations   Influenza Vaccine Annual influenza vaccination during flu season is recommended for all persons aged >= 6 months who do not have contraindications   Pneumococcal Vaccine   * Pneumococcal conjugate vaccine = PCV13 (Prevnar 13), PCV15 (Vaxneuvance),  PCV20 (Prevnar 20)  * Pneumococcal polysaccharide vaccine = PPSV23 (Pneumovax) Adults 19-65 yo with certain risk factors or if 65+ yo  If never received any pneumonia vaccine: recommend Prevnar 20 (PCV20)  Give PCV20 if previously received 1 dose of PCV13 or PPSV23   Hepatitis B Vaccine 3 dose series if at intermediate or high risk (ex: diabetes, end stage renal disease, liver disease)   Respiratory syncytial virus (RSV) Vaccine - COVERED BY MEDICARE PART D  * RSVPreF3 (Arexvy) CDC recommends that adults 60 years of age and older may receive a single dose of RSV vaccine using shared clinical decision-making (SCDM)   Tetanus (Td) Vaccine - COST NOT COVERED BY MEDICARE PART B Following completion of primary series, a booster dose should be given every 10 years to maintain immunity against tetanus. Td may also be given as tetanus wound prophylaxis.   Tdap Vaccine - COST NOT COVERED BY MEDICARE PART B Recommended at least once for all adults. For pregnant patients, recommended with each pregnancy.   Shingles Vaccine (Shingrix) - COST NOT COVERED BY MEDICARE PART B  2 shot series recommended in those 19 years and older who have or will have weakened immune systems or those 50 years and older     Health Maintenance Due:      Topic Date Due   • Colorectal Cancer Screening  Never done   • Hepatitis C Screening  Completed     Immunizations Due:      Topic Date Due   • Pneumococcal Vaccine: 65+ Years (1 of 1 - PCV) Never done   • COVID-19 Vaccine (2 - 2023-24 season) 09/01/2023   • Influenza Vaccine (Season Ended) 09/01/2024     Advance Directives   What are advance directives?  Advance directives are legal documents that state your wishes and plans for medical care. These plans are made ahead of time in case you lose your ability to make decisions for yourself. Advance directives can apply to any medical decision, such as the treatments you want, and if you want to donate organs.   What are the types of advance directives?   There are many types of advance directives, and each state has rules about how to use them. You may choose a combination of any of the following:  Living will:  This is a written record of the treatment you want. You can also choose which treatments you do not want, which to limit, and which to stop at a certain time. This includes surgery, medicine, IV fluid, and tube feedings.   Durable power of  for healthcare (DPAHC):  This is a written record that states who you want to make healthcare choices for you when you are unable to make them for yourself. This person, called a proxy, is usually a family member or a friend. You may choose more than 1 proxy.  Do not resuscitate (DNR) order:  A DNR order is used in case your heart stops beating or you stop breathing. It is a request not to have certain forms of treatment, such as CPR. A DNR order may be included in other types of advance directives.  Medical directive:  This covers the care that you want if you are in a coma, near death, or unable to make decisions for yourself. You can list the treatments you want for each condition. Treatment may include pain medicine, surgery, blood transfusions, dialysis, IV or tube feedings, and a ventilator (breathing machine).  Values history:  This document has questions about your views, beliefs, and how you feel and think about life. This information can help others choose the care that you would choose.  Why are advance directives important?  An advance directive helps you control your care. Although spoken wishes may be used, it is better to have your wishes written down. Spoken wishes can be misunderstood, or not followed. Treatments may be given even if you do not want them. An advance directive may make it easier for your family to make difficult choices about your care.       © Copyright [employer] 2018 Information is for End User's use only and may not be sold, redistributed or otherwise used for commercial  purposes. All illustrations and images included in CareNotes® are the copyrighted property of [email or web address] or [employer]

## 2024-06-21 NOTE — ASSESSMENT & PLAN NOTE
Pt agreeable to schedule vision and dental exams , due for colon cancer screening agreeable to [facility]   
no pleuritic chest pain/no dyspnea/no wheezing/no hemoptysis

## 2024-07-08 ENCOUNTER — CONSULT (OUTPATIENT)
Dept: HEMATOLOGY ONCOLOGY | Facility: CLINIC | Age: 69
End: 2024-07-08
Payer: COMMERCIAL

## 2024-07-08 VITALS
BODY MASS INDEX: 24.49 KG/M2 | DIASTOLIC BLOOD PRESSURE: 68 MMHG | RESPIRATION RATE: 18 BRPM | SYSTOLIC BLOOD PRESSURE: 122 MMHG | OXYGEN SATURATION: 97 % | HEART RATE: 77 BPM | TEMPERATURE: 97.8 F | WEIGHT: 147 LBS | HEIGHT: 65 IN

## 2024-07-08 DIAGNOSIS — L29.9 ITCHING: ICD-10-CM

## 2024-07-08 DIAGNOSIS — D47.1 MYELOPROLIFERATIVE DISORDER (HCC): Primary | ICD-10-CM

## 2024-07-08 DIAGNOSIS — L29.8 OTHER PRURITUS: ICD-10-CM

## 2024-07-08 DIAGNOSIS — D75.1 ERYTHROCYTOSIS: ICD-10-CM

## 2024-07-08 PROCEDURE — 99204 OFFICE O/P NEW MOD 45 MIN: CPT | Performed by: PHYSICIAN ASSISTANT

## 2024-07-08 NOTE — PROGRESS NOTES
Hematology/Oncology Outpatient Consult  Devon Briscoe 68 y.o. male 1955 [id number]    Date:  7/8/2024      Assessment and Plan:  1. Erythrocytosis 2. Itching 3. R/O Myeloproliferative disorder (HCC) 4. Other pruritus  Lab flowsheet below  Based on symptoms and lab studies, concern for myeloproliferative disorder, polycythemia  Patient was educated that JAK2 mutation testing requires authorization.  They will call him once it is authorized and he can go to the lab.  He will follow-up within the next 4 weeks for review    - CBC and differential; Future  - Erythropoietin; Future  - Iron Panel (Includes Ferritin, Iron Sat%, Iron, and TIBC); Future  - JAK2 V617F rfx CALR/MPL/E12-15; Future    HPI:  68-year-old male presents for consultation regarding elevated hematocrit.  This has been for 2 readings.    He denies any tobacco use ever.    He also notes he has diffuse itching without any significant rash for the last 2 months, worsened by taking a hot shower to the point that he cannot take a hot shower and takes a cold shower    States that he drinks 3 bottles of water a day on average.    ROS: Review of Systems   Constitutional:  Negative for appetite change, chills, fatigue, fever and unexpected weight change.   HENT:  Negative for mouth sores and nosebleeds.    Respiratory:  Negative for cough and shortness of breath.    Cardiovascular:  Negative for chest pain, palpitations and leg swelling.   Gastrointestinal:  Negative for abdominal pain, blood in stool, constipation, diarrhea, nausea and vomiting.   Genitourinary:  Negative for difficulty urinating, dysuria and hematuria.   Musculoskeletal:  Negative for arthralgias.   Skin:         Diffuse itching   Worse with hot shower, needs to take a cold shower do to this      Neurological:  Positive for dizziness (sometimes). Negative for weakness, light-headedness, numbness and headaches.   Hematological: Negative.    Psychiatric/Behavioral: Negative.          Past Medical History:   Diagnosis Date    Renal disorder     pylonephritis       Past Surgical History:   Procedure Laterality Date    CARDIAC SURGERY      pt stated two years ago 2022       Social History     Socioeconomic History    Marital status: Single     Spouse name: Not on file    Number of children: Not on file    Years of education: Not on file    Highest education level: Not on file   Occupational History    Not on file   Tobacco Use    Smoking status: Never    Smokeless tobacco: Never   Vaping Use    Vaping status: Not on file   Substance and Sexual Activity    Alcohol use: Yes     Comment: occasional    Drug use: No    Sexual activity: Not on file   Other Topics Concern    Not on file   Social History Narrative    Not on file     Social Determinants of Health     Financial Resource Strain: Low Risk  (6/17/2024)    Overall Financial Resource Strain (CARDIA)     Difficulty of Paying Living Expenses: Not hard at all   Recent Concern: Financial Resource Strain - Medium Risk (5/31/2024)    Overall Financial Resource Strain (CARDIA)     Difficulty of Paying Living Expenses: Somewhat hard   Food Insecurity: No Food Insecurity (6/17/2024)    Hunger Vital Sign     Worried About Running Out of Food in the Last Year: Never true     Ran Out of Food in the Last Year: Never true   Recent Concern: Food Insecurity - Food Insecurity Present (5/31/2024)    Hunger Vital Sign     Worried About Running Out of Food in the Last Year: Sometimes true     Ran Out of Food in the Last Year: Sometimes true   Transportation Needs: No Transportation Needs (6/17/2024)    PRAPARE - Transportation     Lack of Transportation (Medical): No     Lack of Transportation (Non-Medical): No   Physical Activity: Not on file   Stress: Not on file   Social Connections: Not on file   Intimate Partner Violence: Not on file   Housing Stability: Low Risk  (6/17/2024)    Housing Stability Vital Sign     Unable to Pay for Housing in the Last Year:  "No     Number of Times Moved in the Last Year: 1     Homeless in the Last Year: No       Family History   Problem Relation Age of Onset    Alzheimer's disease Mother     Hypertension Father     Lung disease Sister     Hypertension Brother     Obesity Son      No Known Allergies      Current Outpatient Medications:     atorvastatin (LIPITOR) 80 mg tablet, Take 1 tablet (80 mg total) by mouth daily, Disp: 90 tablet, Rfl: 3    carvedilol (Coreg) 3.125 mg tablet, 1 po daily, Disp: 90 tablet, Rfl: 3    clopidogrel (PLAVIX) 75 mg tablet, Take 1 tablet (75 mg total) by mouth daily, Disp: 90 tablet, Rfl: 3    ibuprofen (MOTRIN) 600 mg tablet, Take 1 tablet by mouth every 6 (six) hours as needed for mild pain, Disp: 30 tablet, Rfl: 0    lisinopril (ZESTRIL) 2.5 mg tablet, Take 1 tablet (2.5 mg total) by mouth daily, Disp: 90 tablet, Rfl: 3    Physical Exam:  /68 (BP Location: Left arm, Patient Position: Sitting, Cuff Size: Adult)   Pulse 77   Temp 97.8 °F (36.6 °C) (Temporal)   Resp 18   Ht 5' 5\" (1.651 m)   Wt 66.7 kg (147 lb)   SpO2 97%   BMI 24.46 kg/m²     Physical Exam  Vitals reviewed.   Constitutional:       General: He is not in acute distress.     Appearance: He is well-developed. He is not ill-appearing.   HENT:      Head: Normocephalic and atraumatic.   Eyes:      General: No scleral icterus.     Conjunctiva/sclera: Conjunctivae normal.   Cardiovascular:      Rate and Rhythm: Normal rate and regular rhythm.      Heart sounds: Normal heart sounds. No murmur heard.  Pulmonary:      Effort: Pulmonary effort is normal. No respiratory distress.      Breath sounds: Normal breath sounds.   Abdominal:      Palpations: Abdomen is soft.      Tenderness: There is no abdominal tenderness.   Musculoskeletal:         General: No tenderness. Normal range of motion.      Cervical back: Normal range of motion and neck supple.      Right lower leg: No edema.      Left lower leg: No edema.   Lymphadenopathy:      " Cervical: No cervical adenopathy.   Skin:     General: Skin is warm and dry.   Neurological:      Mental Status: He is alert and oriented to person, place, and time.      Cranial Nerves: No cranial nerve deficit.   Psychiatric:         Mood and Affect: Mood normal.         Behavior: Behavior normal.       Labs:      I have spent 30 minutes with Patient  today in which greater than 50% of this time was spent in counseling/coordination of care regarding Diagnostic results, Risks and benefits of tx options, Instructions for management, Impressions, Documenting in the medical record, Reviewing / ordering tests, medicine, procedures  , and Obtaining or reviewing history  .     Patient voiced understanding and agreement in the above discussion. Aware to contact our office with questions/symptoms in the interim.     This note has been generated by voice recognition software system.  Therefore, there may be spelling, grammar, and or syntax errors. Please contact if questions arise.

## 2024-07-10 ENCOUNTER — APPOINTMENT (OUTPATIENT)
Dept: LAB | Facility: CLINIC | Age: 69
End: 2024-07-10
Payer: COMMERCIAL

## 2024-07-10 DIAGNOSIS — D75.1 ERYTHROCYTOSIS: ICD-10-CM

## 2024-07-10 DIAGNOSIS — L29.9 ITCHING: ICD-10-CM

## 2024-07-10 DIAGNOSIS — D47.1 MYELOPROLIFERATIVE DISORDER (HCC): ICD-10-CM

## 2024-07-10 PROCEDURE — 36415 COLL VENOUS BLD VENIPUNCTURE: CPT

## 2024-07-19 ENCOUNTER — OFFICE VISIT (OUTPATIENT)
Dept: DENTISTRY | Facility: CLINIC | Age: 69
End: 2024-07-19

## 2024-07-19 DIAGNOSIS — Z01.21 ENCOUNTER FOR DENTAL EXAMINATION AND CLEANING WITH ABNORMAL FINDINGS: Primary | ICD-10-CM

## 2024-07-19 PROCEDURE — D0210 INTRAORAL - COMPLETE SERIES OF RADIOGRAPHIC IMAGES: HCPCS

## 2024-07-19 PROCEDURE — D0140 LIMITED ORAL EVALUATION - PROBLEM FOCUSED: HCPCS | Performed by: DENTIST

## 2024-07-19 RX ORDER — ASPIRIN 325 MG
325 TABLET ORAL DAILY
COMMUNITY

## 2024-07-19 NOTE — DENTAL PROCEDURE DETAILS
Pt arrived to dental van at [facility] for NP apt.   ASA III-hx. Bypass surgery 2022.   CC: none  Upper sorbian speaking     Assessment, FMX  DR Miller examined: large decay #17,18-will need extractions. Gingival inflammation #14,15 linguals. Deep perio pockets.Mobility #18  OH poor-heavy general. calculus  Van coordinator explained to pt. All trt needs and recommendations-pt understood.     NV: Debridement-can be done at [place] or in van  NVV: Refer [place] Comp exam and panorex, Periochart  Extract #17,18  Restore #14 and possible other teeth following comp exam findings

## 2024-07-21 PROBLEM — Z00.00 MEDICARE ANNUAL WELLNESS VISIT, SUBSEQUENT: Status: RESOLVED | Noted: 2024-06-17 | Resolved: 2024-07-21

## 2024-07-31 ENCOUNTER — TELEPHONE (OUTPATIENT)
Dept: HEMATOLOGY ONCOLOGY | Facility: CLINIC | Age: 69
End: 2024-07-31

## 2024-07-31 DIAGNOSIS — D75.1 ERYTHROCYTOSIS: Primary | ICD-10-CM

## 2024-07-31 DIAGNOSIS — D47.1 MYELOPROLIFERATIVE DISORDER (HCC): ICD-10-CM

## 2024-08-01 ENCOUNTER — APPOINTMENT (OUTPATIENT)
Dept: LAB | Facility: CLINIC | Age: 69
End: 2024-08-01
Payer: COMMERCIAL

## 2024-08-01 DIAGNOSIS — D47.1 MYELOPROLIFERATIVE DISORDER (HCC): ICD-10-CM

## 2024-08-01 DIAGNOSIS — D75.1 ERYTHROCYTOSIS: ICD-10-CM

## 2024-08-01 LAB
FERRITIN SERPL-MCNC: 116 NG/ML (ref 24–336)
IRON SATN MFR SERPL: 46 % (ref 15–50)
IRON SERPL-MCNC: 130 UG/DL (ref 50–212)
TIBC SERPL-MCNC: 281 UG/DL (ref 250–450)
UIBC SERPL-MCNC: 151 UG/DL (ref 155–355)

## 2024-08-01 PROCEDURE — 83540 ASSAY OF IRON: CPT

## 2024-08-01 PROCEDURE — 83550 IRON BINDING TEST: CPT

## 2024-08-01 PROCEDURE — 82728 ASSAY OF FERRITIN: CPT

## 2024-08-05 ENCOUNTER — OFFICE VISIT (OUTPATIENT)
Dept: HEMATOLOGY ONCOLOGY | Facility: CLINIC | Age: 69
End: 2024-08-05
Payer: COMMERCIAL

## 2024-08-05 VITALS
HEART RATE: 88 BPM | RESPIRATION RATE: 18 BRPM | OXYGEN SATURATION: 94 % | BODY MASS INDEX: 23.66 KG/M2 | TEMPERATURE: 97.8 F | SYSTOLIC BLOOD PRESSURE: 122 MMHG | HEIGHT: 65 IN | DIASTOLIC BLOOD PRESSURE: 80 MMHG | WEIGHT: 142 LBS

## 2024-08-05 DIAGNOSIS — R21 RASH OF FOOT: Primary | ICD-10-CM

## 2024-08-05 PROCEDURE — G2211 COMPLEX E/M VISIT ADD ON: HCPCS | Performed by: PHYSICIAN ASSISTANT

## 2024-08-05 PROCEDURE — 99214 OFFICE O/P EST MOD 30 MIN: CPT | Performed by: PHYSICIAN ASSISTANT

## 2024-08-05 RX ORDER — MOMETASONE FUROATE 1 MG/ML
SOLUTION TOPICAL DAILY
Qty: 60 ML | Refills: 1 | Status: SHIPPED | OUTPATIENT
Start: 2024-08-05

## 2024-08-05 NOTE — PROGRESS NOTES
Hematology/Oncology Outpatient Follow-up  Devon Briscoe 68 y.o. male 1955 [id number]    Date:  8/5/2024      Assessment and Plan:  2. Erythrocytosis   Workup erythrocytosis was negative; negative EPO, iron panel normal, negative JAK2/MPL/CALR  Repeat CBC showed resolution of erythrocytosis, likely favoring dehydration being because of his elevated red blood cell count  Continue routine follow-up with PCP  Follow-up here as needed    1. Rash of foot  Recommend to start this and see podiatry  Looks like eczema type rash in between his toes   Apply at nighttime     - Ambulatory Referral to Podiatry; Future  - mometasone (ELOCON) 0.1 % lotion; Apply topically daily To itching areas of the skin  Dispense: 60 mL; Refill: 1     HPI:  68-year male presents for follow-up visit regarding history of erythrocytosis for which she underwent workup.  He is here to review workup.    Interval history:    ROS: Review of Systems   Constitutional:  Negative for appetite change, chills, fatigue, fever and unexpected weight change.   Respiratory:  Negative for cough and shortness of breath.    Cardiovascular:  Negative for chest pain, palpitations and leg swelling.   Gastrointestinal:  Negative for abdominal pain, constipation, diarrhea, nausea and vomiting.   Genitourinary:  Negative for difficulty urinating, dysuria and hematuria.   Musculoskeletal:  Negative for arthralgias.   Skin:  Positive for rash (with itching).   Neurological:  Negative for dizziness, weakness, light-headedness, numbness and headaches.   Hematological: Negative.    Psychiatric/Behavioral: Negative.         Past Medical History:   Diagnosis Date    Coronary artery disease     Hypertension     Renal disorder     pylonephritis       Past Surgical History:   Procedure Laterality Date    CARDIAC SURGERY      pt stated two years ago 2022 bypass surgery       Social History     Socioeconomic History    Marital status: Single     Spouse name: None    Number  of children: None    Years of education: None    Highest education level: None   Occupational History    None   Tobacco Use    Smoking status: Never    Smokeless tobacco: Never   Vaping Use    Vaping status: Never Used   Substance and Sexual Activity    Alcohol use: Yes     Comment: occasional    Drug use: No    Sexual activity: None   Other Topics Concern    None   Social History Narrative    None     Social Determinants of Health     Financial Resource Strain: Low Risk  (6/17/2024)    Overall Financial Resource Strain (CARDIA)     Difficulty of Paying Living Expenses: Not hard at all   Recent Concern: Financial Resource Strain - Medium Risk (5/31/2024)    Overall Financial Resource Strain (CARDIA)     Difficulty of Paying Living Expenses: Somewhat hard   Food Insecurity: No Food Insecurity (6/17/2024)    Hunger Vital Sign     Worried About Running Out of Food in the Last Year: Never true     Ran Out of Food in the Last Year: Never true   Recent Concern: Food Insecurity - Food Insecurity Present (5/31/2024)    Hunger Vital Sign     Worried About Running Out of Food in the Last Year: Sometimes true     Ran Out of Food in the Last Year: Sometimes true   Transportation Needs: No Transportation Needs (6/17/2024)    PRAPARE - Transportation     Lack of Transportation (Medical): No     Lack of Transportation (Non-Medical): No   Physical Activity: Not on file   Stress: Not on file   Social Connections: Not on file   Intimate Partner Violence: Not on file   Housing Stability: Low Risk  (6/17/2024)    Housing Stability Vital Sign     Unable to Pay for Housing in the Last Year: No     Number of Times Moved in the Last Year: 1     Homeless in the Last Year: No       Family History   Problem Relation Age of Onset    Alzheimer's disease Mother     Hypertension Father     Lung disease Sister     Hypertension Brother     Obesity Son     Stomach cancer Maternal Uncle        No Known Allergies      Current Outpatient Medications:  "    aspirin 325 mg tablet, Take 325 mg by mouth daily, Disp: , Rfl:     atorvastatin (LIPITOR) 80 mg tablet, Take 1 tablet (80 mg total) by mouth daily, Disp: 90 tablet, Rfl: 3    clopidogrel (PLAVIX) 75 mg tablet, Take 1 tablet (75 mg total) by mouth daily, Disp: 90 tablet, Rfl: 3    ibuprofen (MOTRIN) 600 mg tablet, Take 1 tablet by mouth every 6 (six) hours as needed for mild pain, Disp: 30 tablet, Rfl: 0    mometasone (ELOCON) 0.1 % lotion, Apply topically daily To itching areas of the skin, Disp: 60 mL, Rfl: 1    carvedilol (Coreg) 3.125 mg tablet, 1 po daily (Patient not taking: Reported on 7/19/2024), Disp: 90 tablet, Rfl: 3    lisinopril (ZESTRIL) 2.5 mg tablet, Take 1 tablet (2.5 mg total) by mouth daily (Patient not taking: Reported on 7/19/2024), Disp: 90 tablet, Rfl: 3      Physical Exam:  /80 (BP Location: Left arm, Patient Position: Sitting, Cuff Size: Adult)   Pulse 88   Temp 97.8 °F (36.6 °C) (Temporal)   Resp 18   Ht 5' 5\" (1.651 m)   Wt 64.4 kg (142 lb)   SpO2 94%   BMI 23.63 kg/m²     Physical Exam  Constitutional:       General: He is not in acute distress.     Appearance: He is well-developed. He is not ill-appearing.   HENT:      Head: Normocephalic and atraumatic.   Eyes:      General: No scleral icterus.     Conjunctiva/sclera: Conjunctivae normal.   Cardiovascular:      Rate and Rhythm: Normal rate and regular rhythm.      Heart sounds: Normal heart sounds. No murmur heard.  Pulmonary:      Effort: Pulmonary effort is normal. No respiratory distress.      Breath sounds: Normal breath sounds.   Abdominal:      Palpations: Abdomen is soft.      Tenderness: There is no abdominal tenderness.   Musculoskeletal:         General: No tenderness. Normal range of motion.      Cervical back: Normal range of motion and neck supple.      Right lower leg: No edema.      Left lower leg: No edema.   Lymphadenopathy:      Cervical: No cervical adenopathy.   Skin:     General: Skin is warm and " dry.      Comments: Eczema appearing rash in between his toes as well as between fingers but worse on toes   Neurological:      Mental Status: He is alert and oriented to person, place, and time.      Cranial Nerves: No cranial nerve deficit.   Psychiatric:         Mood and Affect: Mood normal.         Behavior: Behavior normal.       Labs:  Lab Results   Component Value Date    WBC 8.68 08/01/2024    HGB 16.4 08/01/2024    HCT 48.3 08/01/2024    MCV 98 08/01/2024     08/01/2024     I have spent 20 minutes with Patient  today in which greater than 50% of this time was spent in counseling/coordination of care regarding Diagnostic results, Risks and benefits of tx options, Instructions for management, Impressions, Documenting in the medical record, Reviewing / ordering tests, medicine, procedures  , and Obtaining or reviewing history  .     Patient voiced understanding and agreement in the above discussion. Aware to contact our office with questions/symptoms in the interim.     This note has been generated by voice recognition software system.  Therefore, there may be spelling, grammar, and or syntax errors. Please contact if questions arise.